# Patient Record
Sex: FEMALE | Race: BLACK OR AFRICAN AMERICAN | NOT HISPANIC OR LATINO | URBAN - METROPOLITAN AREA
[De-identification: names, ages, dates, MRNs, and addresses within clinical notes are randomized per-mention and may not be internally consistent; named-entity substitution may affect disease eponyms.]

---

## 2018-08-17 ENCOUNTER — OFFICE (OUTPATIENT)
Dept: URBAN - METROPOLITAN AREA CLINIC 11 | Facility: CLINIC | Age: 68
End: 2018-08-17

## 2018-08-17 VITALS
HEIGHT: 60 IN | SYSTOLIC BLOOD PRESSURE: 151 MMHG | HEART RATE: 72 BPM | WEIGHT: 155 LBS | DIASTOLIC BLOOD PRESSURE: 85 MMHG

## 2018-08-17 DIAGNOSIS — K21.9 GASTRO-ESOPHAGEAL REFLUX DISEASE WITHOUT ESOPHAGITIS: ICD-10-CM

## 2018-08-17 PROCEDURE — 99204 OFFICE O/P NEW MOD 45 MIN: CPT | Performed by: INTERNAL MEDICINE

## 2018-08-17 RX ORDER — DEXLANSOPRAZOLE 60 MG/1
120 CAPSULE, DELAYED RELEASE ORAL
Qty: 60 | Refills: 3 | Status: ACTIVE

## 2018-08-17 NOTE — SERVICEHPINOTES
She was referred for evaluation of her GERD stating she needed another test.  She has been on meds for over 5 years and has had GERD issues for many years.  She had a recent evaluation by EGD which was benign.  She has been on Dexilant in the mornings and pepcid at night.  She has had brekathrough reflux daily.  Eating makes it worse but it occurs without eating.  She has had a persistent feeling of something sitting in her chest at times.  She has regurgitation during the day and night.  She has not had dypshagia as such.  She has been on Prevacid, Zantac, Prilosec, and Nexium in the past. She has a history of diabetes.  It is not under consistent control.  She thought her last A1C was 6.9.Her referral record was reveiwed including her recent EGD/colonoscopy.  She is due for a repeat colonoscopy over the next year due to prep issues.

## 2018-08-17 NOTE — SERVICENOTES
We discusseed her chronic GERD, prior meds, diet issues, lifestyle issues, possible surgical tx, potential causes (including motiltiy issues, nonacid reflux, etc...) and evaluation options.  We discussed starting with a Bravo/EGD but she wanted to try the medication changes with BID Dexilant and HS Pepcid.